# Patient Record
Sex: FEMALE | Race: OTHER | URBAN - METROPOLITAN AREA
[De-identification: names, ages, dates, MRNs, and addresses within clinical notes are randomized per-mention and may not be internally consistent; named-entity substitution may affect disease eponyms.]

---

## 2019-10-25 ENCOUNTER — HOSPITAL ENCOUNTER (OUTPATIENT)
Dept: LAB | Age: 24
Discharge: HOME | End: 2019-10-25
Payer: SELF-PAY

## 2019-10-25 ENCOUNTER — OFFICE VISIT (OUTPATIENT)
Dept: OBGYN CLINIC | Age: 24
End: 2019-10-25

## 2019-10-25 VITALS
DIASTOLIC BLOOD PRESSURE: 70 MMHG | BODY MASS INDEX: 28.78 KG/M2 | SYSTOLIC BLOOD PRESSURE: 117 MMHG | HEART RATE: 111 BPM | WEIGHT: 146.6 LBS | HEIGHT: 60 IN | OXYGEN SATURATION: 99 % | RESPIRATION RATE: 20 BRPM

## 2019-10-25 DIAGNOSIS — N92.6 MISSED MENSES: ICD-10-CM

## 2019-10-25 DIAGNOSIS — N92.6 MISSED MENSES: Primary | ICD-10-CM

## 2019-10-25 LAB
BASOPHILS # BLD: 0 K/UL (ref 0–0.1)
BASOPHILS NFR BLD: 0 % (ref 0–2)
DIFFERENTIAL METHOD BLD: ABNORMAL
EOSINOPHIL # BLD: 0.1 K/UL (ref 0–0.4)
EOSINOPHIL NFR BLD: 1 % (ref 0–5)
ERYTHROCYTE [DISTWIDTH] IN BLOOD BY AUTOMATED COUNT: 14.6 % (ref 11.6–14.5)
HCG URINE, QL. (POC): POSITIVE
HCT VFR BLD AUTO: 34.5 % (ref 35–45)
HGB BLD-MCNC: 10.9 G/DL (ref 12–16)
LYMPHOCYTES # BLD: 2 K/UL (ref 0.9–3.6)
LYMPHOCYTES NFR BLD: 17 % (ref 21–52)
MCH RBC QN AUTO: 26.5 PG (ref 24–34)
MCHC RBC AUTO-ENTMCNC: 31.6 G/DL (ref 31–37)
MCV RBC AUTO: 83.9 FL (ref 74–97)
MONOCYTES # BLD: 0.8 K/UL (ref 0.05–1.2)
MONOCYTES NFR BLD: 7 % (ref 3–10)
NEUTS SEG # BLD: 8.9 K/UL (ref 1.8–8)
NEUTS SEG NFR BLD: 75 % (ref 40–73)
PLATELET # BLD AUTO: 255 K/UL (ref 135–420)
PMV BLD AUTO: 11.5 FL (ref 9.2–11.8)
RBC # BLD AUTO: 4.11 M/UL (ref 4.2–5.3)
VALID INTERNAL CONTROL?: YES
WBC # BLD AUTO: 11.8 K/UL (ref 4.6–13.2)

## 2019-10-25 PROCEDURE — 86762 RUBELLA ANTIBODY: CPT

## 2019-10-25 PROCEDURE — 86900 BLOOD TYPING SEROLOGIC ABO: CPT

## 2019-10-25 PROCEDURE — 87086 URINE CULTURE/COLONY COUNT: CPT

## 2019-10-25 PROCEDURE — 87389 HIV-1 AG W/HIV-1&-2 AB AG IA: CPT

## 2019-10-25 PROCEDURE — 85025 COMPLETE CBC W/AUTO DIFF WBC: CPT

## 2019-10-25 PROCEDURE — 83021 HEMOGLOBIN CHROMOTOGRAPHY: CPT

## 2019-10-25 PROCEDURE — 86780 TREPONEMA PALLIDUM: CPT

## 2019-10-25 PROCEDURE — 36415 COLL VENOUS BLD VENIPUNCTURE: CPT

## 2019-10-25 PROCEDURE — 87340 HEPATITIS B SURFACE AG IA: CPT

## 2019-10-25 NOTE — PROGRESS NOTES
Missed menses/Amenorrhea    Patient presents for missed menses/amenorrhea office visit. She is unsure of her LMP. She denies any abdominal cramping or vaginal bleeding. Her ultrasound results are listed in the imaging section of today's chart. Her estimated due date will be based on today's and is 3.8. 20. She is 20w5d today. She will have a Morphology scan with Ms. Keith Balderas. Her prenatal labs will also be drawn today. The entire visit, including the ultrasound, lasted 30 minutes. I spoke with Ms. Franck Castanon through [address] on the [employer].

## 2019-10-25 NOTE — PATIENT INSTRUCTIONS
Geraldine Sachs - [ Learning About Pregnancy ] Instrucciones de cuidado Henry isa mayra las primeras semanas del [facility] es de particular importancia para la isa de henry bebé. Cuídese. Cualquier cosa que perjudique henry organismo puede perjudicar a henry bebé. Asegúrese de asistir a todas steve citas médicas. Los chequeos médicos regulares les ayudarán a usted y a henry bebé a mantenerse saludables. La atención de seguimiento es bettina parte clave de henry tratamiento y seguridad. Asegúrese de hacer y acudir a todas las citas, y llame a henry médico si está teniendo problemas. También es bettina buena idea saber los resultados de steve exámenes y mantener bettina lista de los medicamentos que chase. Cómo puede cuidarse en el hogar? Alimentación 
  · Siga bettina Fartun Gallon. Asegúrese de incluir en henry dieta abundantes frijoles (habichuelas), arvejas (chícharos) y verduras de hojas verdes.  
  · No se salte las comidas ni pase muchas horas sin comer. Si tiene náuseas, trate de comer un refrigerio pequeño y saludable cada 2 o 3 horas.  
  · No consuma pescados que tengan [address] de jose cruz, doug tiburón, pez marv o caballa. No coma más de bettina rebecca de atún a la semana.  
  · Jamal abundantes líquidos, suficientes para que henry orina sea de color amarillo clarisa o transparente doug el agua. Si tiene [employer], el corazón o el hígado y tiene que [place]'s líquidos, hable con henry médico antes de aumentar henry consumo.  
  · Reduzca la cafeína, doug el café, el té y las bebidas de cola.  
  · No jamal alcohol, doug cerveza, vino o licor margo.  
  · Reagan un multivitamínico que contenga al menos 400 microgramos (mcg) de ácido fólico para ayudar a prevenir las anomalías congénitas (de nacimiento). El cereal enriquecido y el pantoja integral son buenas grayson adicionales de ácido fólico.  
  · Aumente el calcio en henry [place].  Trate de beber un cuarto de galón de [employer]. También podría corinne suplementos de calcio y elegir alimentos doug el queso y el yogur.  
Mal [place] de brennan 
  · Asegúrese de asistir a las citas de seguimiento.  
  · Descanse lo suficiente. Mientras esté embarazada podría sentirse más cansada de lo normal.  
  · Ruchi por lo menos 30 minutos de ejercicio la mayoría de los días de la [place]. Caminar es bettina buena opción. Si no ha hecho ejercicio en el pasado, comience poco a poco. Ruchi varias caminatas cortas todos los días.  
  · No fume. Si necesita ayuda para dejar de fumar, hable con henry médico sobre programas para dejar de fumar. Estos pueden aumentar steve probabilidades de dejar el hábito para siempre.  
  · No toque heces de [place] ni henry caja de excrementos. Además, lávese las epifanio luego de manipular carne cruda y cocine supriya toda la carne antes de comerla. Use guantes cuando trabaje en el jardín y [employer] epifanio cuando termine. Las heces de [place], la carne cruda o poco cocida, y la thomas contaminada pueden causar infecciones que podrían perjudicar a ehnry bebé o conducir a un aborto espontáneo.  
  · No use bañeras de hidromasaje ni saunas. Elevar la temperatura corporal podría perjudicar a henry bebé.   · Evite los gases de las sustancias químicas y la pintura, o los venenos.  
  · No use drogas ilegales ni jamal alcohol. Medicamentos 
  · Revise todos los medicamentos que esté tomando con henry Arvid Crofts sea necesario cambiar algunos de steve medicamentos de rutina para proteger al bebé.   · [place] acetaminofén (Tylenol) para [facility], doug dolor de [place] o de espalda leves o fiebre leve con síntomas de resfriado.  No utilice medicamentos antiinflamatorios no esteroideos (BRIDGER), tales doug ibuprofeno (Advil, Motrin) o naproxeno (Aleve), a menos que henry médico lo autorice.  
  · No tome dos o más analgésicos (medicamentos para el dolor) al [employer] tiempo a menos que el médico se lo haya indicado. Muchos analgésicos contienen acetaminofén, es decir, Tylenol. El exceso de acetaminofén (Tylenol) puede ser dañino.  
  · [place] los medicamentos exactamente doug le fueron recetados. Llame a henry médico si rasheeda estar teniendo problemas con henry medicamento.  
 Para manejar las náuseas del embarazo 
  · Si siente náuseas al levantarse, pruebe a corinne un refrigerio pequeño (doug galletas saladas) antes de salir de la cama. Dese algún tiempo para digerir el refrigerio y salga de la cama lentamente.  
  · No se salte las comidas ni pase mucho tiempo sin comer. Un estómago vacío puede empeorar las náuseas.  
  · Consuma comidas pequeñas y frecuentes en lugar de yohana comidas abundantes al día.  
  · [place] abundantes líquidos. Las bebidas deportivas, doug Gatorade o Powerade, son buenas opciones.  
  · Consuma alimentos ricos en proteínas franky bajos en grasas.  
  · Si está tomando suplementos de palmer, pregúntele a henry médico si son necesarios. El palmer puede empeorar las náuseas.  
  · Evite cualquier [employer] le produzca náuseas, doug el del café.  
  · Descanse mucho. Las náuseas del embarazo podrían empeorar si está cansada. Dónde puede encontrar más información en inglés? Therese Amend a http://liane-sonia.info/. Sylvia Calloway S813 en la búsqueda para aprender más acerca de \"Aprenda sobre el Rosemary Estimable - [ Elizabeth Orts About Pregnancy ]. \" 
Revisado: [address], 2019 Versión del contenido: 12.2 © 2797-4417 [employer]wise, [employer]orporated. Las instrucciones de cuidado fueron adaptadas bajo licencia por [employer] (which disclaims liability or warranty for this information). Si usted tiene [place] afección médica o sobre estas instrucciones, siempre pregunte a henry profesional de isa. [facility] niega toda garantía o responsabilidad por henry uso de esta información.

## 2019-10-25 NOTE — PROGRESS NOTES
Patient is here today for her missed menses . Her LMP wasunknown . This patient hasnot  been seen a provider for this pregnancy     /70   Pulse (!) 111   Resp 20   Ht 5' 0.04\" (1.525 m)   Wt 146 lb 9.6 oz (66.5 kg)   LMP  (LMP Unknown)   SpO2 99%   BMI 28.59 kg/m²     Dating ultra sound competed by Dr Silvio Chavez folder was given to her   /70   Pulse (!) 111   Resp 20   Ht 5' 0.04\" (1.525 m)   Wt 146 lb 9.6 oz (66.5 kg)   LMP  (LMP Unknown)   SpO2 99%   BMI 28.59 kg/m²   The patient  does not have a history of falls. I did not complete a risk assessment. No flowsheet data found. @NUTRITIONSUMMARY   Depression Scale            1. Have you been to the ER, urgent care clinic since your last visit? Hospitalized since your last visit? No    2. Have you seen or consulted any other health care providers outside of the [address] [place] [address] since your last visit? Include any pap smears or colon screening. No    No results found for this or any previous visit.   Confirmation is completed this patient will return in 3 weeks for her first OB visit

## 2019-10-26 LAB
ABO + RH BLD: NORMAL
BLOOD GROUP ANTIBODIES SERPL: NORMAL
HBV SURFACE AG SER QL: <0.1 INDEX
HBV SURFACE AG SER QL: NEGATIVE
SPECIMEN EXP DATE BLD: NORMAL

## 2019-10-27 LAB
BACTERIA SPEC CULT: NORMAL
HIV 1+2 AB+HIV1 P24 AG SERPL QL IA: NONREACTIVE
HIV12 RESULT COMMENT, HHIVC: NORMAL
SERVICE CMNT-IMP: NORMAL

## 2019-10-28 LAB
DEPRECATED HGB OTHER BLD-IMP: 0 %
HGB A MFR BLD: 97.6 % (ref 96.4–98.8)
HGB A2 MFR BLD COLUMN CHROM: 2.4 % (ref 1.8–3.2)
HGB C MFR BLD: 0 %
HGB F MFR BLD: 0 % (ref 0–2)
HGB FRACT BLD-IMP: NORMAL
HGB S BLD QL SOLY: NEGATIVE
HGB S MFR BLD: 0 %
RUBV IGG SER-IMP: NORMAL
T PALLIDUM AB SER QL IA: NONREACTIVE